# Patient Record
Sex: FEMALE | URBAN - METROPOLITAN AREA
[De-identification: names, ages, dates, MRNs, and addresses within clinical notes are randomized per-mention and may not be internally consistent; named-entity substitution may affect disease eponyms.]

---

## 2024-10-31 ENCOUNTER — APPOINTMENT (RX ONLY)
Dept: URBAN - METROPOLITAN AREA CLINIC 80 | Facility: CLINIC | Age: 49
Setting detail: DERMATOLOGY
End: 2024-10-31

## 2024-10-31 DIAGNOSIS — L71.8 OTHER ROSACEA: ICD-10-CM

## 2024-10-31 DIAGNOSIS — I83.9 ASYMPTOMATIC VARICOSE VEINS OF LOWER EXTREMITIES: ICD-10-CM

## 2024-10-31 PROBLEM — I83.93 ASYMPTOMATIC VARICOSE VEINS OF BILATERAL LOWER EXTREMITIES: Status: ACTIVE | Noted: 2024-10-31

## 2024-10-31 PROCEDURE — ? COUNSELING

## 2024-10-31 PROCEDURE — 99204 OFFICE O/P NEW MOD 45 MIN: CPT

## 2024-10-31 PROCEDURE — ? ADDITIONAL NOTES

## 2024-10-31 PROCEDURE — ? PRESCRIPTION MEDICATION MANAGEMENT

## 2024-10-31 PROCEDURE — ? PRESCRIPTION

## 2024-10-31 PROCEDURE — ? PHOTO-DOCUMENTATION

## 2024-10-31 RX ORDER — OXYMETAZOLINE HYDROCHLORIDE 1 G/100G
1 CREAM TOPICAL QAM
Qty: 30 | Refills: 5 | Status: ERX | COMMUNITY
Start: 2024-10-31

## 2024-10-31 RX ORDER — AZELAIC ACID 0.15 G/G
1 GEL TOPICAL QHS
Qty: 50 | Refills: 10 | Status: ERX | COMMUNITY
Start: 2024-10-31

## 2024-10-31 RX ORDER — DOXYCYCLINE HYCLATE 100 MG/1
1 TABLET, COATED ORAL QD
Qty: 31 | Refills: 0 | Status: ERX | COMMUNITY
Start: 2024-10-31

## 2024-10-31 RX ADMIN — OXYMETAZOLINE HYDROCHLORIDE 1: 1 CREAM TOPICAL at 00:00

## 2024-10-31 RX ADMIN — AZELAIC ACID 1: 0.15 GEL TOPICAL at 00:00

## 2024-10-31 RX ADMIN — DOXYCYCLINE HYCLATE 1: 100 TABLET, COATED ORAL at 00:00

## 2024-10-31 ASSESSMENT — LOCATION SIMPLE DESCRIPTION DERM
LOCATION SIMPLE: RIGHT PRETIBIAL REGION
LOCATION SIMPLE: LEFT CHEEK
LOCATION SIMPLE: LEFT PRETIBIAL REGION

## 2024-10-31 ASSESSMENT — LOCATION DETAILED DESCRIPTION DERM
LOCATION DETAILED: LEFT CENTRAL MALAR CHEEK
LOCATION DETAILED: RIGHT PROXIMAL PRETIBIAL REGION
LOCATION DETAILED: LEFT PROXIMAL PRETIBIAL REGION

## 2024-10-31 ASSESSMENT — LOCATION ZONE DERM
LOCATION ZONE: LEG
LOCATION ZONE: FACE

## 2024-10-31 NOTE — PROCEDURE: PRESCRIPTION MEDICATION MANAGEMENT
Detail Level: Zone
Plan: Recommended gentle cleansers, moisturizers, sunscreens. Recommended La Roche Posay and Vanicream.
Render In Strict Bullet Format?: No
Initiate Treatment: Rhofade, apply to entire face in the morning. \\nAzelaic acid, apply to the entire face at night.\\nDoxycycline 20mg, take two take one pill once a day as needed for flares.

## 2024-10-31 NOTE — HPI: RASH
What Type Of Note Output Would You Prefer (Optional)?: Standard Output
How Severe Is Your Rash?: mild
Is This A New Presentation, Or A Follow-Up?: Rash
Additional History: The patient states that she has had symptoms for several years, states that it comes and goes in severity. Patient states that it worsens with heat, alcohol. The patient states she has history of rosacea, state. She has previously on doxycycline with some relief. The patient denies any current interventions.
No

## 2024-10-31 NOTE — PROCEDURE: ADDITIONAL NOTES
Additional Notes: Followup with Vascular specialist.
Detail Level: Simple
Render Risk Assessment In Note?: no